# Patient Record
Sex: FEMALE | Race: BLACK OR AFRICAN AMERICAN | Employment: UNEMPLOYED | URBAN - METROPOLITAN AREA
[De-identification: names, ages, dates, MRNs, and addresses within clinical notes are randomized per-mention and may not be internally consistent; named-entity substitution may affect disease eponyms.]

---
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
## 2023-02-21 ENCOUNTER — NURSE TRIAGE (OUTPATIENT)
Dept: OTHER | Facility: CLINIC | Age: 18
End: 2023-02-21

## 2023-02-21 NOTE — TELEPHONE ENCOUNTER
Location of patient: [address] call from Cesar HOUGH at [facility]; Patient with [employer] requesting to establish care with [facility]. Subjective: Caller states \"I am having pain in my vagina\"     Current Symptoms: pelvic pain, \"leaking urine\", white vaginal discharge    Onset: 1 week ago;       Pain Severity: 6/10; Temperature: denies     What has been tried: nothing    Denies - pain with urination / blood in urine     LMP:  1/28/2023  Pregnant: no    Recommended disposition: See in Office Today  Patient does not have a PCP and was advised to go to an [address] [place], [place] [address] Ave for current problem. Care advice provided, patient verbalizes understanding; denies any other questions or concerns; instructed to call back for any new or worsening symptoms. Patient/Caller agrees with recommended disposition; writer provided warm transfer to [facility] for appointment scheduling    Attention Provider: Thank you for allowing me to participate in the care of your patient. The patient was connected to triage in response to information provided to the [facility]. Please do not respond through this encounter as the response is not directed to a shared pool.       Reason for Disposition   MILD to MODERATE pain that comes and goes (cramps) and present > 48 hours    Protocols used: Pelvic Pain - Female-ADULT-[place]

## 2023-02-21 NOTE — TELEPHONE ENCOUNTER
Location of patient: Kelley Barrios call from [place] at [facility] with [employer]. Subjective: Caller states \"I am having vaginal pain\"     Current Symptoms: White and yellow vaginal discharge. Pain with urination. Also with rib pain. Onset: 4 days ago; worsening    Associated Symptoms: NA    Pain Severity: 6/10; cracked and burning; intermittent, moderate    Temperature: denies fever     LMP: NA Pregnant: No    Recommended disposition: See in Office Today/UCC or Walk In back up. Care advice provided, patient verbalizes understanding; denies any other questions or concerns; instructed to call back for any new or worsening symptoms. Patient/Caller agrees with recommended disposition; writer provided warm transfer to Jovanny Norton at [facility] for appointment scheduling as she wants to get established with a new PCP. Attention Provider: Thank you for allowing me to participate in the care of your patient. The patient was connected to triage in response to information provided to the [facility]. Please do not respond through this encounter as the response is not directed to a shared pool.     Reason for Disposition   Bad smelling vaginal discharge    Protocols used: Vaginal Discharge-ADULT-[place]

## 2023-02-27 ENCOUNTER — HOSPITAL ENCOUNTER (OUTPATIENT)
Age: 18
Setting detail: SPECIMEN
Discharge: HOME | End: 2023-02-27

## 2023-02-27 ENCOUNTER — OFFICE VISIT (OUTPATIENT)
Dept: FAMILY MEDICINE CLINIC | Age: 18
End: 2023-02-27
Payer: MEDICAID

## 2023-02-27 VITALS
DIASTOLIC BLOOD PRESSURE: 70 MMHG | OXYGEN SATURATION: 98 % | TEMPERATURE: 97.3 F | HEART RATE: 83 BPM | SYSTOLIC BLOOD PRESSURE: 118 MMHG | HEIGHT: 62 IN | BODY MASS INDEX: 39.38 KG/M2 | WEIGHT: 214 LBS

## 2023-02-27 DIAGNOSIS — N17.9 AKI (ACUTE KIDNEY INJURY) (HCC): ICD-10-CM

## 2023-02-27 DIAGNOSIS — R30.0 DYSURIA: ICD-10-CM

## 2023-02-27 DIAGNOSIS — N89.8 VAGINAL DISCHARGE: ICD-10-CM

## 2023-02-27 DIAGNOSIS — Z11.3 SCREEN FOR STD (SEXUALLY TRANSMITTED DISEASE): ICD-10-CM

## 2023-02-27 DIAGNOSIS — D50.9 IRON DEFICIENCY ANEMIA, UNSPECIFIED IRON DEFICIENCY ANEMIA TYPE: ICD-10-CM

## 2023-02-27 DIAGNOSIS — Z76.89 ENCOUNTER TO ESTABLISH CARE: Primary | ICD-10-CM

## 2023-02-27 LAB
BILIRUBIN, POC: ABNORMAL
BLOOD URINE, POC: ABNORMAL
CANDIDA SPECIES, DNA PROBE: NEGATIVE
CLARITY, POC: CLEAR
COLOR, POC: ABNORMAL
GARDNERELLA VAGINALIS, DNA PROBE: POSITIVE
GLUCOSE URINE, POC: ABNORMAL
KETONES, POC: ABNORMAL
LEUKOCYTE EST, POC: ABNORMAL
NITRITE, POC: ABNORMAL
PH, POC: 6
PROTEIN, POC: 100
SOURCE: ABNORMAL
SPECIFIC GRAVITY, POC: 1.03
TRICHOMONAS VAGINALIS DNA: NEGATIVE
UROBILINOGEN, POC: 1

## 2023-02-27 PROCEDURE — 99204 OFFICE O/P NEW MOD 45 MIN: CPT | Performed by: STUDENT IN AN ORGANIZED HEALTH CARE EDUCATION/TRAINING PROGRAM

## 2023-02-27 PROCEDURE — 81003 URINALYSIS AUTO W/O SCOPE: CPT | Performed by: STUDENT IN AN ORGANIZED HEALTH CARE EDUCATION/TRAINING PROGRAM

## 2023-02-27 SDOH — ECONOMIC STABILITY: HOUSING INSECURITY
IN THE LAST 12 MONTHS, WAS THERE A TIME WHEN YOU DID NOT HAVE A STEADY PLACE TO SLEEP OR SLEPT IN A SHELTER (INCLUDING NOW)?: NO

## 2023-02-27 SDOH — ECONOMIC STABILITY: INCOME INSECURITY: HOW HARD IS IT FOR YOU TO PAY FOR THE VERY BASICS LIKE FOOD, HOUSING, MEDICAL CARE, AND HEATING?: NOT HARD AT ALL

## 2023-02-27 SDOH — ECONOMIC STABILITY: FOOD INSECURITY: WITHIN THE PAST 12 MONTHS, THE FOOD YOU BOUGHT JUST DIDN'T LAST AND YOU DIDN'T HAVE MONEY TO GET MORE.: NEVER TRUE

## 2023-02-27 SDOH — ECONOMIC STABILITY: FOOD INSECURITY: WITHIN THE PAST 12 MONTHS, YOU WORRIED THAT YOUR FOOD WOULD RUN OUT BEFORE YOU GOT MONEY TO BUY MORE.: NEVER TRUE

## 2023-02-27 ASSESSMENT — PATIENT HEALTH QUESTIONNAIRE - PHQ9
SUM OF ALL RESPONSES TO PHQ QUESTIONS 1-9: 0
SUM OF ALL RESPONSES TO PHQ QUESTIONS 1-9: 0
SUM OF ALL RESPONSES TO PHQ9 QUESTIONS 1 & 2: 0
1. LITTLE INTEREST OR PLEASURE IN DOING THINGS: 0
SUM OF ALL RESPONSES TO PHQ QUESTIONS 1-9: 0
2. FEELING DOWN, DEPRESSED OR HOPELESS: 0
SUM OF ALL RESPONSES TO PHQ QUESTIONS 1-9: 0

## 2023-02-27 NOTE — PROGRESS NOTES
[address] PRIMARY CARE  [address] [place] [address] [address] [place] [address]  Dept: [phone]  Dept Fax: [phone]    Chelsi Gilbert is a 21 y.o. female who is a New patient, who presents today for her medical conditions/complaints as noted below:  Chief Complaint   Patient presents with    Establish Care    Vaginal Pain    Rib Pain     No injury         HPI:     She is a 66-year-old female here today to establish care as a new patient. She has a past medical history significant for pyelonephritis with MINE, and iron deficiency anemia. She states that she has been having vaginal pain and burning with urination. She is also noticed thick vaginal discharge. She is a single mother and states that she takes care of her 3year-old child, she currently lives with her sister. She states that she does not work anywhere. She also wants to screen for for STDs. No results found for: LABA1C          ( goal A1Cis < 7)   No results found for: LABMICR  No results found for: LDLCHOLESTEROL, LDLCALC    (goal LDL is <100)   AST (U/L)   Date Value   06/13/2022 14     ALT (U/L)   Date Value   06/13/2022 19     BUN (mg/dL)   Date Value   06/14/2022 6     BP Readings from Last 3 Encounters:   02/27/23 118/70   06/14/22 125/88   01/22/21 118/80 (85 %, Z = 1.04 /  96 %, Z = 1.75)*     *BP percentiles are based on the 2017 AAP Clinical Practice Guideline for girls          (goal 120/80)    Past Medical History:   Diagnosis Date    Anemia during pregnancy 8/24/2020    Venofer 300 mg weekly x 3 First infusion scheduled for 8/29/2020 at 12 noon, pt father notified. -SK-NO SHOW    Dysmenorrhea 8/11/2020    Desires mirena PP    History of chlamydia (need TOR) 8/11/2020 7/28/20+ BULL negative 8/25/20    Hx Gonorrhea (need TOR) 8/11/2020    Positive 7/28/20 BULL negative 8/25/20    Hx Trich (BULL neg) 8/11/2020 7/28/20 positive. BULL neg 8/25/20.     Mirena IUD placed 9/29/2020 9/29/2020    Lot: Arielle Rollins Exp date: Nov 2022 Remove 9/29/2025      History reviewed. No pertinent surgical history. Family History   Problem Relation Age of Onset    Hypertension Father     Diabetes Father     No Known Problems Mother        Social History     Tobacco Use    Smoking status: Never    Smokeless tobacco: Never   Substance Use Topics    Alcohol use: Never      Current Outpatient Medications   Medication Sig Dispense Refill    metroNIDAZOLE (FLAGYL) 500 MG tablet Take 1 tablet by mouth 2 times daily for 7 days 14 tablet 0    ferrous sulfate (FE TABS) 325 (65 Fe) MG EC tablet Take 1 tablet by mouth 2 times daily (Patient not taking: No sig reported) 60 tablet 3     No current facility-administered medications for this visit. No Known Allergies    Health Maintenance   Topic Date Due    COVID-19 Vaccine (1) Never done    Hepatitis A vaccine (2 of 2 - 2-dose series) 02/27/2024 (Originally 2/21/2019)    HPV vaccine (2 - 2-dose series) 02/27/2024 (Originally 2/21/2019)    Flu vaccine (1) 02/27/2024 (Originally 8/1/2022)    Depression Screen  02/27/2024    Chlamydia/GC screen  02/27/2024    DTaP/Tdap/Td vaccine (4 - Td or Tdap) 07/28/2030    Hib vaccine  Completed    Varicella vaccine  Completed    Hepatitis C screen  Completed    HIV screen  Completed    Meningococcal (ACWY) vaccine  Aged Out    Pneumococcal 0-64 years Vaccine  Aged Out       Subjective:     Review of Systems   Constitutional:  Negative for appetite change, fatigue and fever. HENT:  Negative for congestion, ear pain, hearing loss and sore throat. Eyes:  Negative for discharge and visual disturbance. Respiratory:  Negative for cough, chest tightness, shortness of breath and wheezing. Cardiovascular:  Negative for chest pain, palpitations and leg swelling. Gastrointestinal:  Negative for abdominal pain, constipation, diarrhea, nausea and vomiting. Genitourinary:  Positive for dysuria, frequency and vaginal discharge.  Negative for flank pain, hematuria and urgency. Musculoskeletal:  Negative for arthralgias, gait problem, joint swelling and myalgias. Skin: Negative. Neurological:  Negative for dizziness, weakness, numbness and headaches. Psychiatric/Behavioral: Negative. Negative for dysphoric mood. The patient is not nervous/anxious. Objective:     Physical Exam  Vitals reviewed. Constitutional:       Appearance: Normal appearance. She is normal weight. HENT:      Head: Normocephalic and atraumatic. Nose: Nose normal.      Mouth/Throat:      Mouth: Mucous membranes are moist.      Pharynx: Oropharynx is clear. Eyes:      Extraocular Movements: Extraocular movements intact. Conjunctiva/sclera: Conjunctivae normal.      Pupils: Pupils are equal, round, and reactive to light. Cardiovascular:      Rate and Rhythm: Normal rate and regular rhythm. Heart sounds: Normal heart sounds. No murmur heard. No gallop. Pulmonary:      Effort: Pulmonary effort is normal. No respiratory distress. Breath sounds: Normal breath sounds. No stridor. No wheezing. Abdominal:      General: Bowel sounds are normal. There is no distension. Palpations: Abdomen is soft. Tenderness: There is no abdominal tenderness. There is no guarding or rebound. Musculoskeletal:         General: No swelling or tenderness. Normal range of motion. Cervical back: Normal range of motion and neck supple. Skin:     General: Skin is warm and dry. Coloration: Skin is not jaundiced. Findings: No rash. Neurological:      General: No focal deficit present. Mental Status: She is alert and oriented to person, place, and time. Psychiatric:         Mood and Affect: Mood normal.         Behavior: Behavior normal.         Thought Content:  Thought content normal.         Judgment: Judgment normal.     /70   Pulse 83   Temp 97.3 °F (36.3 °C)   Ht 5' 1.5\" (1.562 m)   Wt 214 lb (97.1 kg)   SpO2 98%   BMI 39.78 kg/m² Assessment/Plan:   1. Encounter to establish care  2. MINE (acute kidney injury) ([facility] [address]  -     Comprehensive Metabolic Panel, Fasting; Future  3. Iron deficiency anemia, unspecified iron deficiency anemia type  -     CBC with Auto Differential; Future  -     Iron and TIBC; Future  -     Ferritin; Future  -     Vitamin B12 & Folate; Future  4. Vaginal discharge  -     Vaginitis DNA Probe; Future  -     C.trachomatis N.gonorrhoeae DNA, Urine; Future  -     [facility]  5. Dysuria  -     POCT Urinalysis No Micro (Auto)  -     Culture, Urine  6. Screen for STD (sexually transmitted disease)  -     HIV Screen; Future  -     T. Pallidum Ab; Future  -     Hepatitis Panel, Acute; Future  7. BMI (body mass index), pediatric, 95-99% for age  -     Hemoglobin A1C; Future  -     Lipid, Fasting; Future  -     TSH with Reflex; Future  -     Vitamin D 25 Hydroxy; Future      MINE-in the setting of pyelonephritis, has had previous elevations in creatinine on several occasions    Chronic hypokalemia-not on any potassium supplement currently    Iron deficiency anemia-not taking any iron supplement currently    Return in about 3 months (around 5/27/2023) for Follow up labs. Orders Placed This Encounter   Procedures    Vaginitis DNA Probe     Standing Status:   Future     Number of Occurrences:   1     Standing Expiration Date:   2/27/2024    C.trachomatis N.gonorrhoeae DNA, Urine     Standing Status:   Future     Number of Occurrences:   1     Standing Expiration Date:   2/27/2024    Culture, Urine     Order Specific Question:   Specify (ex-cath, midstream, cysto, etc)?      Answer:   midstream    CBC with Auto Differential     Standing Status:   Future     Standing Expiration Date:   8/27/2023    Comprehensive Metabolic Panel, Fasting     Standing Status:   Future     Standing Expiration Date:   8/27/2023    Hemoglobin A1C     Standing Status:   Future     Standing Expiration Date:   8/27/2023 Lipid, Fasting     Standing Status:   Future     Standing Expiration Date:   8/27/2023    TSH with Reflex     Standing Status:   Future     Standing Expiration Date:   8/27/2023    Vitamin D 25 Hydroxy     Standing Status:   Future     Standing Expiration Date:   8/27/2023    Iron and TIBC     Standing Status:   Future     Standing Expiration Date:   2/27/2024    Ferritin     Standing Status:   Future     Standing Expiration Date:   2/27/2024    Vitamin B12 & Folate     Standing Status:   Future     Standing Expiration Date:   2/27/2024    HIV Screen     Standing Status:   Future     Standing Expiration Date:   2/27/2024    T. Pallidum Ab     Standing Status:   Future     Standing Expiration Date:   2/27/2024    Hepatitis Panel, Acute     Standing Status:   Future     Standing Expiration Date:   2/27/2024    [facility]     Referral Priority:   Routine     Referral Type:   Eval and Treat     Referral Reason:   Specialty Services Required     Requested Specialty:   Obstetrics & Gynecology     Number of Visits Requested:   1    POCT Urinalysis No Micro (Auto)     No orders of the defined types were placed in this encounter. Patient given educational materials - see patient instructions. Discussed use, benefit, and side effects of prescribed medications. All patientquestions answered. Pt voiced understanding. Reviewed health maintenance. Instructedto continue current medications, diet and exercise. Patient agreed with treatmentplan. Follow up as directed.      Electronically signed by Noble Najjar, MD on 2/28/2023 at 9:18 PM

## 2023-02-28 DIAGNOSIS — N76.0 BACTERIAL VAGINOSIS: Primary | ICD-10-CM

## 2023-02-28 DIAGNOSIS — N30.00 ACUTE CYSTITIS WITHOUT HEMATURIA: Primary | ICD-10-CM

## 2023-02-28 DIAGNOSIS — B96.89 BACTERIAL VAGINOSIS: Primary | ICD-10-CM

## 2023-02-28 LAB
MICROORGANISM SPEC CULT: ABNORMAL
SPECIMEN DESCRIPTION: ABNORMAL

## 2023-02-28 RX ORDER — METRONIDAZOLE 500 MG/1
500 TABLET ORAL 2 TIMES DAILY
Qty: 14 TABLET | Refills: 0 | Status: SHIPPED | OUTPATIENT
Start: 2023-02-28 | End: 2023-03-07

## 2023-02-28 ASSESSMENT — ENCOUNTER SYMPTOMS
COUGH: 0
VOMITING: 0
ABDOMINAL PAIN: 0
CHEST TIGHTNESS: 0
DIARRHEA: 0
NAUSEA: 0
CONSTIPATION: 0
SORE THROAT: 0
EYE DISCHARGE: 0
WHEEZING: 0
SHORTNESS OF BREATH: 0

## 2023-02-28 NOTE — RESULT ENCOUNTER NOTE
Please let her know that her swab was positive for bacterial vaginitis, sending prescription for metronidazole

## 2023-03-01 DIAGNOSIS — Z11.3 SCREEN FOR STD (SEXUALLY TRANSMITTED DISEASE): Primary | ICD-10-CM

## 2023-03-01 LAB
CHLAMYDIA DNA UR QL NAA+PROBE: ABNORMAL
N GONORRHOEA DNA UR QL NAA+PROBE: ABNORMAL
SPECIMEN DESCRIPTION: ABNORMAL

## 2023-05-02 NOTE — PROGRESS NOTES
"Subjective:      Patient ID: Ijeoma Issa is a 50 y.o. female.    Vitals:  height is 5' 4" (1.626 m) and weight is 114.8 kg (253 lb). Her temperature is 98.3 °F (36.8 °C). Her blood pressure is 137/98 (abnormal) and her pulse is 81. Her respiration is 16 and oxygen saturation is 96%.     Chief Complaint: Nausea    Nausea  This is a new problem. The current episode started yesterday. The problem has been gradually improving. Associated symptoms include nausea. Associated symptoms comments: Vomiting and diarrhea  .     Gastrointestinal:  Positive for nausea.    Objective:     Physical Exam   Constitutional: No distress.   HENT:   Head: Normocephalic and atraumatic.   Ears:   Right Ear: External ear normal.   Left Ear: External ear normal.   Eyes: Extraocular movement intact   Pulmonary/Chest: Effort normal.   Abdominal: Normal appearance and bowel sounds are normal. She exhibits no distension and no mass. Soft. There is no abdominal tenderness. There is no rebound, no guarding, no left CVA tenderness and no right CVA tenderness. No hernia.   Neurological: She is alert.   Psychiatric: Mood normal.   Nursing note and vitals reviewed.    Assessment:     1. Viral gastroenteritis        Plan:       Viral gastroenteritis  -     ondansetron (ZOFRAN-ODT) 4 MG TbDL; Take 1 tablet (4 mg total) by mouth every 8 (eight) hours as needed (nausea and vomiting).  Dispense: 20 tablet; Refill: 0                Patient Instructions     Diarrhea  If your condition worsens or fails to improve we recommend that you receive another evaluation at the ER immediately or contact your PCP to discuss your concerns or return here. You must understand that you've received an urgent care treatment only and that you may be released before all your medical problems are known or treated. You the patient will arrange for followup care as instructed.   If you have diarrhea you can use Pepto Bismol.  Avoid Imodium.    Avoid any greasy, spicy, "
CLINICAL PHARMACY NOTE: MEDS TO [address] Select Patient?: No  Total # of Prescriptions Filled: 2   The following medications were delivered to the patient:  · dok  · motrin  Total # of Interventions Completed: 0  Time Spent (min): 0    Additional Documentation:
Labor Progress Note    Alexandra Friedman is a 10 y.o. female  at 38w9d  The patient was seen and examined. Her pain is well controlled. She reports fetal movement is present, complains of contractions, complains of loss of fluid, denies vaginal bleeding.        Vital Signs:  Vitals:    20 0831 20 0901 20 0927 20 1001   BP: 111/51 111/64 113/55 118/70   Pulse: 97 84 90 88   Resp: 16 18 16 16   Temp:    98.2 °F (36.8 °C)   TempSrc:    Oral   SpO2:       Weight:       Height:             FHT: 120, moderate variability, accelerations present, decelerations absent  Contractions: regular, every 1-2 minutes  Cervical Exam: 6 cm dilated, 90 effaced, 0 station  Pitocin: @ 2 mu/min    Membranes: Ruptured clear fluid  Scalp Electrode in place: absent  Intrauterine Pressure Catheter in Place: absent    Interventions: none    Assessment/Plan:  Alexandra Friedman is a 10 y.o. female  at 38w9d admitted for spontaneous labor   - GBS negative, No indication for GBS prophylaxis              - VSS, afebrile              - Pitocin @ 0830              - AROM (thick mec) @ 0550              - Epidural              - Continue pitocin per protocol              - Continue expectant management       Attending updated and in agreement with plan    Micky Cerna DO  Ob/Gyn Resident  2020, 10:12 AM
Labor Progress Note    Bea Zazueta is a 10 y.o. female  at 38w9d  The patient was seen and examined. Her pain is well controlled. She reports fetal movement is present, complains of contractions, complains of loss of fluid, denies vaginal bleeding.        Vital Signs:  Vitals:    20 0630 20 0731 20 0801 20 0831   BP: 95/53 100/49 106/48 111/51   Pulse: 85 94 96 97   Resp: 16 16 16 16   Temp:   97.5 °F (36.4 °C)    TempSrc:   Oral    SpO2:       Weight:       Height:             FHT: 120, moderate variability, accelerations present, decelerations absent  Contractions: regular, every 1-2 minutes  Cervical Exam: 5 cm dilated, 90 effaced, 0 station; per RN  Pitocin: @ 1 mu/min    Membranes: Ruptured meconium stained  Scalp Electrode in place: absent  Intrauterine Pressure Catheter in Place: absent    Interventions: none    Assessment/Plan:  Bea Zazueta is a 10 y.o. female  at 38w9d admitted for spontaneous labor   - GBS negative, No indication for GBS prophylaxis   - VSS, afebrile   - Pitocin @ 0830   - AROM (thick mec) @ 0550   - Epidural   - Continue pitocin per protocol   - Continue expectant management        Senior resident updated and in agreement with plan    Olga Weiss DO  Ob/Gyn Resident  2020, 9:01 AM
Labor Progress Note    Mayur Jimenez is a 10 y.o. female  at 38w9d  The patient was seen and examined. Her pain is well controlled. She reports fetal movement is present, complains of contractions, complains of loss of fluid, denies vaginal bleeding.        Vital Signs:  Vitals:    20 1430 20 1501 20 1531 20 1601   BP: 120/80 115/73 106/68 116/64   Pulse: 119 113 126 120   Resp: 18 18 16 18   Temp:       TempSrc:       SpO2:       Weight:       Height:             FHT: 140, moderate variability, accelerations present, decelerations absent  Contractions: regular, every 1-2 minutes  Cervical Exam: 9 cm dilated, 90 effaced, +1 station  Pitocin: @ 0 mu/min    Membranes: Ruptured meconium stained  Scalp Electrode in place: absent  Intrauterine Pressure Catheter in Place: absent    Interventions: none    Assessment/Plan:  Mayur Jimenez is a 10 y.o. female  at 38w9d admitted for spontaneous labor    - GBS negative, No indication for GBS prophylaxis              - VSS, afebrile              - Pitocin @ 0830              - AROM (thick mec) @ 0550 (NICU notified)   - IUPC              - Epidural              - Continue pitocin per protocol              - Continue expectant management      Attending updated and in agreement with plan    Radu Kidd DO  Ob/Gyn Resident  2020, 4:11 PM
Labor Progress Note    Rivera Pérez is a 10 y.o. female  at 38w9d  The patient was seen and examined. Her pain is well controlled. She reports fetal movement is present, complains of contractions, complains of loss of fluid, denies vaginal bleeding. Solitario catheter and IUPC placed without difficulty, patient tolerated well. Vital Signs:  Vitals:    20 1300 20 1301 20 1330 20 1400   BP:  109/64 120/75 127/81   Pulse:  112 100 102   Resp:  16 16 18   Temp:    98.8 °F (37.1 °C)   TempSrc:    Oral   SpO2: 99%  99%    Weight:       Height:           FHT: 155, moderate variability, accelerations present, decelerations absent  Contractions: regular, every 2 minutes  Cervical Exam: 8 cm dilated, 90% effaced, 0 station  Pitocin: @ 2 mu/min    Membranes: Ruptured meconium stained  Scalp Electrode in place: absent  Intrauterine Pressure Catheter in Place: present    Interventions: IUPC placed without difficulty, patient tolerated well    Assessment/Plan:  Rivera Pérez is a 10 y.o. female  at 38w9d admitted for Spontaneous Labor   - GBS negative, No indication for GBS prophylaxis   - COVID-19 negative on 20   - CEFM, TOCO   - IVF (LR @ 125 cc/hr)   - Continue pitocin per protocol    - AROM (thick meconium stained fluid) @ 0550 (NICU notified)   - IUPC placed without difficulty   - Epidural in place   - Continue expectant management     Attending updated and in agreement with plan.      Tulio Ferrell DO  Ob/Gyn Resident  2020, 2:14 PM
Pt presents to L and D, accompanied by Father, via abulatory. Pt here for contractions that started 09/28/2020 around 6pm and are irregular. Pt gowned and in bed, oriented to room and call light. EFM explained and applied. FHT's 132. Dr. Clem Bergeron notified of admission.
fatty or acidic foods at this time.    Increase fluids and rest is important.  Start a clear liquid diet and progress to BRAT diet (see attached handout).     Watch for any increase pain, fever, localized pain to right lower abdomen or continued vomiting or diarrhea.      Nausea & Vomiting  If your condition worsens or fails to improve we recommend that you receive another evaluation at the ER immediately or contact your PCP to discuss your concerns or return here. You must understand that you've received an urgent care treatment only and that you may be released before all your medical problems are known or treated. You the patient will arrange for followup care as instructed.   Use prescribed anti-nausea medicine as needed and prescribed   Increase fluids and rest is important   Start off with liquid diet and progress as tolerated (see below)  Water and clear liquids are important so you do not get dehydrated. Drink a small amount at a time.  Do not force yourself to eat, especially if you have cramps, vomiting, or diarrhea. When you finally decide to start eating, do not eat large amounts at a time, even if you are hungry.  If you eat, avoid fatty, greasy, spicy, or fried foods.  Do not eat dairy products if you have diarrhea; they can make the diarrhea worse  Watch for any increase pain, fever, localized pain to right lower abdomen or continued vomiting or diarrhea.      
infant in [facility], circumcision desired   - Encourage ambulation  2. Rh positive/Rubella immune  3. Bottle feeding    - Denies s/s mastitis  4. Teen pregnancy  - SW consult PP  5. Anemia  - Hgb 10.0  - clinically asymptomatic  6. Hx GC/C  - BULL negative  7. Hx Trich   - BULL negative  8. BMI 36.6  9. Continue post partum care    Counseling Completed:  Secondary Smoke risks and Sudden Infant Death Syndrome were reviewed with recommendations. Infant sleeping, \"back to sleep\" and avoidance of co-sleeping recommendations were reviewed. Signs and Symptoms of Post Partum Depression were reviewed. The patient is to call if any occur. Signs and symptoms of Mastitis were reviewed. The patient is to call if any occur for follow up.   Discharge instructions including pelvic rest, no driving with pain medicine and office follow-up were reviewed with patient     Attending Physician: Dr. Daya Armendariz DO  Ob/Gyn Resident   9/30/2020, 6:41 AM

## 2024-05-27 NOTE — PROGRESS NOTES
Patient present for UPT, LMP 12/2019 ,UPT results positive, and results given to patient and patient's father. Skin normal color for race, warm, dry and intact. No evidence of rash.

## 2024-10-04 ENCOUNTER — HOSPITAL ENCOUNTER (EMERGENCY)
Age: 19
Discharge: HOME | End: 2024-10-04
Attending: EMERGENCY MEDICINE
Payer: MEDICAID

## 2024-10-04 VITALS
TEMPERATURE: 99.3 F | BODY MASS INDEX: 43.62 KG/M2 | OXYGEN SATURATION: 99 % | SYSTOLIC BLOOD PRESSURE: 120 MMHG | WEIGHT: 231.04 LBS | HEIGHT: 61 IN | DIASTOLIC BLOOD PRESSURE: 79 MMHG | HEART RATE: 100 BPM | RESPIRATION RATE: 16 BRPM

## 2024-10-04 DIAGNOSIS — N10 ACUTE PYELONEPHRITIS: Primary | ICD-10-CM

## 2024-10-04 DIAGNOSIS — B96.89 GARDNERELLA ASSOCIATED VAGINAL DISCHARGE: ICD-10-CM

## 2024-10-04 DIAGNOSIS — N76.0 GARDNERELLA ASSOCIATED VAGINAL DISCHARGE: ICD-10-CM

## 2024-10-04 LAB
ALBUMIN SERPL-MCNC: 4 G/DL (ref 3.5–5.2)
ALBUMIN/GLOB SERPL: 1 {RATIO} (ref 1–2.5)
ALP SERPL-CCNC: 97 U/L (ref 35–104)
ALT SERPL-CCNC: 16 U/L (ref 10–35)
ANION GAP SERPL CALCULATED.3IONS-SCNC: 8 MMOL/L (ref 9–16)
AST SERPL-CCNC: 23 U/L (ref 10–35)
BACTERIA URNS QL MICRO: ABNORMAL
BILIRUB SERPL-MCNC: 0.4 MG/DL (ref 0–1.2)
BILIRUB UR QL STRIP: NEGATIVE
BUN SERPL-MCNC: 13 MG/DL (ref 6–20)
C TRACH DNA SPEC QL PROBE+SIG AMP: NORMAL
CALCIUM SERPL-MCNC: 9 MG/DL (ref 8.6–10.4)
CANDIDA SPECIES: NEGATIVE
CASTS #/AREA URNS LPF: ABNORMAL /LPF (ref 0–8)
CHLORIDE SERPL-SCNC: 104 MMOL/L (ref 98–107)
CLARITY UR: ABNORMAL
CO2 SERPL-SCNC: 23 MMOL/L (ref 20–31)
COLOR UR: YELLOW
CREAT SERPL-MCNC: 0.8 MG/DL (ref 0.5–0.9)
EPI CELLS #/AREA URNS HPF: ABNORMAL /HPF (ref 0–5)
ERYTHROCYTE [DISTWIDTH] IN BLOOD BY AUTOMATED COUNT: 15.8 % (ref 11.8–14.4)
GARDNERELLA VAGINALIS: POSITIVE
GFR, ESTIMATED: >90 ML/MIN/1.73M2
GLUCOSE SERPL-MCNC: 84 MG/DL (ref 74–99)
GLUCOSE UR STRIP-MCNC: NEGATIVE MG/DL
HCG SERPL QL: NEGATIVE
HCT VFR BLD AUTO: 35 % (ref 36.3–47.1)
HGB BLD-MCNC: 10.9 G/DL (ref 11.9–15.1)
HGB UR QL STRIP.AUTO: NEGATIVE
HIV 1+2 AB+HIV1 P24 AG SERPL QL IA: NONREACTIVE
KETONES UR STRIP-MCNC: NEGATIVE MG/DL
LEUKOCYTE ESTERASE UR QL STRIP: ABNORMAL
LIPASE SERPL-CCNC: 30 U/L (ref 13–60)
MCH RBC QN AUTO: 27.2 PG (ref 25.2–33.5)
MCHC RBC AUTO-ENTMCNC: 31.1 G/DL (ref 28.4–34.8)
MCV RBC AUTO: 87.3 FL (ref 82.6–102.9)
N GONORRHOEA DNA SPEC QL PROBE+SIG AMP: NORMAL
NITRITE UR QL STRIP: NEGATIVE
NRBC BLD-RTO: 0 PER 100 WBC
PH UR STRIP: 5.5 [PH] (ref 5–8)
PLATELET # BLD AUTO: 210 K/UL (ref 138–453)
PMV BLD AUTO: 11 FL (ref 8.1–13.5)
POTASSIUM SERPL-SCNC: 3.8 MMOL/L (ref 3.7–5.3)
PROT SERPL-MCNC: 8.2 G/DL (ref 6.6–8.7)
PROT UR STRIP-MCNC: ABNORMAL MG/DL
RBC # BLD AUTO: 4.01 M/UL (ref 3.95–5.11)
RBC #/AREA URNS HPF: ABNORMAL /HPF (ref 0–4)
SODIUM SERPL-SCNC: 135 MMOL/L (ref 136–145)
SOURCE: ABNORMAL
SP GR UR STRIP: 1.03 (ref 1–1.03)
SPECIMEN DESCRIPTION: NORMAL
T PALLIDUM AB SER QL IA: NONREACTIVE
TRICHOMONAS: NEGATIVE
UROBILINOGEN UR STRIP-ACNC: NORMAL EU/DL (ref 0–1)
WBC #/AREA URNS HPF: ABNORMAL /HPF (ref 0–5)
WBC OTHER # BLD: 7.6 K/UL (ref 4.5–13.5)

## 2024-10-04 PROCEDURE — 85027 COMPLETE CBC AUTOMATED: CPT

## 2024-10-04 PROCEDURE — 87086 URINE CULTURE/COLONY COUNT: CPT

## 2024-10-04 PROCEDURE — 87510 GARDNER VAG DNA DIR PROBE: CPT

## 2024-10-04 PROCEDURE — 87389 HIV-1 AG W/HIV-1&-2 AB AG IA: CPT

## 2024-10-04 PROCEDURE — 87660 TRICHOMONAS VAGIN DIR PROBE: CPT

## 2024-10-04 PROCEDURE — 80053 COMPREHEN METABOLIC PANEL: CPT

## 2024-10-04 PROCEDURE — 87491 CHLMYD TRACH DNA AMP PROBE: CPT

## 2024-10-04 PROCEDURE — 87591 N.GONORRHOEAE DNA AMP PROB: CPT

## 2024-10-04 PROCEDURE — 81001 URINALYSIS AUTO W/SCOPE: CPT

## 2024-10-04 PROCEDURE — 99284 EMERGENCY DEPT VISIT MOD MDM: CPT

## 2024-10-04 PROCEDURE — 83690 ASSAY OF LIPASE: CPT

## 2024-10-04 PROCEDURE — 84703 CHORIONIC GONADOTROPIN ASSAY: CPT

## 2024-10-04 PROCEDURE — 2580000003 HC RX 258

## 2024-10-04 PROCEDURE — 86780 TREPONEMA PALLIDUM: CPT

## 2024-10-04 PROCEDURE — 86403 PARTICLE AGGLUT ANTBDY SCRN: CPT

## 2024-10-04 PROCEDURE — 87480 CANDIDA DNA DIR PROBE: CPT

## 2024-10-04 RX ORDER — ONDANSETRON 4 MG/1
4 TABLET, ORALLY DISINTEGRATING ORAL 3 TIMES DAILY PRN
Qty: 10 TABLET | Refills: 0 | Status: SHIPPED | OUTPATIENT
Start: 2024-10-04

## 2024-10-04 RX ORDER — CEPHALEXIN 500 MG/1
500 CAPSULE ORAL 4 TIMES DAILY
Qty: 28 CAPSULE | Refills: 0 | Status: SHIPPED | OUTPATIENT
Start: 2024-10-04 | End: 2024-10-11

## 2024-10-04 RX ORDER — METRONIDAZOLE 500 MG/1
500 TABLET ORAL 2 TIMES DAILY
Qty: 14 TABLET | Refills: 0 | Status: SHIPPED | OUTPATIENT
Start: 2024-10-04 | End: 2024-10-11

## 2024-10-04 RX ORDER — 0.9 % SODIUM CHLORIDE 0.9 %
1000 INTRAVENOUS SOLUTION INTRAVENOUS ONCE
Status: COMPLETED | OUTPATIENT
Start: 2024-10-04 | End: 2024-10-04

## 2024-10-04 RX ADMIN — SODIUM CHLORIDE 1000 ML: 9 INJECTION, SOLUTION INTRAVENOUS at 13:00

## 2024-10-04 ASSESSMENT — ENCOUNTER SYMPTOMS
CONSTIPATION: 0
ABDOMINAL PAIN: 0
DIARRHEA: 0
NAUSEA: 0
SHORTNESS OF BREATH: 0
VOMITING: 0

## 2024-10-04 ASSESSMENT — LIFESTYLE VARIABLES
HOW MANY STANDARD DRINKS CONTAINING ALCOHOL DO YOU HAVE ON A TYPICAL DAY: PATIENT DOES NOT DRINK
HOW OFTEN DO YOU HAVE A DRINK CONTAINING ALCOHOL: NEVER

## 2024-10-04 ASSESSMENT — PAIN - FUNCTIONAL ASSESSMENT: PAIN_FUNCTIONAL_ASSESSMENT: 0-10

## 2024-10-04 ASSESSMENT — PAIN SCALES - GENERAL: PAINLEVEL_OUTOF10: 7

## 2024-10-04 ASSESSMENT — PAIN DESCRIPTION - DESCRIPTORS: DESCRIPTORS: SHARP

## 2024-10-04 ASSESSMENT — PAIN DESCRIPTION - ORIENTATION: ORIENTATION: LEFT

## 2024-10-04 ASSESSMENT — PAIN DESCRIPTION - LOCATION: LOCATION: ABDOMEN

## 2024-10-04 NOTE — DISCHARGE INSTRUCTIONS
You were seen in the emergency department for left-sided flank pain as well as vaginal discharge.  As we discussed, there is evidence of urinary tract infection.  You must take Keflex 4 times daily until the course is complete to treat this.    Your vaginal discharge is likely related to bacterial vaginosis.  This is an overgrowth of bacteria in the vagina, it is not a sexually transmitted infection.    The treatment for bacterial vaginosis is Flagyl twice daily.    Ensure you complete the antibiotics entirely.  Do not stop taking it until the course is complete.    Given that you are on 2 antibiotics, it is likely that you will become nauseous.  A short course of Zofran has been sent to your pharmacy.  Only take this as needed for nausea/vomiting.    Treat complete the entire course of antibiotics.  Return to the emergency room if you have worsening pain, worsening discharge, fever or chills, or any other acute medical concern.    Schedule an appointment to be seen by your primary care doctor soon as possible for further follow-up.

## 2024-10-04 NOTE — ED PROVIDER NOTES
[facility] ED  Emergency Department Encounter  Emergency Medicine Resident     Pt Name:Breanne Mckeon  MRN: [id number]  Birthdate 2005  Date of evaluation: 10/4/24  PCP:  Makeda Whitfield MD  Note Started: 12:29 PM EDT      CHIEF COMPLAINT       Chief Complaint   Patient presents with    Abdominal Pain     Left side       HISTORY OF PRESENT ILLNESS  (Location/Symptom, Timing/Onset, Context/Setting, Quality, Duration, Modifying Factors, Severity.)      Breanne Mckeon is a 18 y.o. female who presents with worsening left flank pain intermittently over the past few weeks.  Patient does endorse increased urinary frequency, denies dysuria, denies hematuria, denies any trauma.    Patient does also complain of vaginal discharge, denies pelvic pain, denies vaginal bleeding, states that she does not believe she is pregnant.  Patient does have a history of STDs and is sexually active with 1 male sexual partner with intermittent condom use.  She states she does use the Mirena coil.    Patient denies fever or chills, denies previous abdominal surgical history.  She denies ongoing medications.  She states that she is a couple of days late on her period.    She denies nausea or vomiting, denies constipation or diarrhea, denies hematochezia.  She states the last time she felt this way she was told that she had a kidney infection.    PAST MEDICAL / SURGICAL / SOCIAL / FAMILY HISTORY      has a past medical history of Anemia during pregnancy, Dysmenorrhea, History of chlamydia (need TOR), Hx Gonorrhea (need TOR), Hx Trich (BULL neg), and Mirena IUD placed 9/29/2020.     has no past surgical history on file.    Social History     Socioeconomic History    Marital status: Single     Spouse name: Not on file    Number of children: Not on file    Years of education: Not on file    Highest education level: Not on file   Occupational History    Not on file   Tobacco Use    Smoking status: Never    Smokeless tobacco: Never  Gardnerella associated vaginal discharge          DISPOSITION / PLAN     DISPOSITION Decision To Discharge 10/04/2024 03:07:30 PM  Condition at Disposition: Data Unavailable      PATIENT REFERRED TO:  [facility] ED  [address] [address]  [phone]  Go to   If symptoms worsen    Makeda Whitfield MD  [address] [facility]  [place] [address]  [phone]    Schedule an appointment as soon as possible for a visit         DISCHARGE MEDICATIONS:  Discharge Medication List as of 10/4/2024  3:42 PM        START taking these medications    Details   cephALEXin (KEFLEX) 500 MG capsule Take 1 capsule by mouth 4 times daily for 7 days, Disp-28 capsule, R-0Normal      metroNIDAZOLE (FLAGYL) 500 MG tablet Take 1 tablet by mouth 2 times daily for 7 days, Disp-14 tablet, R-0Normal      ondansetron (ZOFRAN-ODT) 4 MG disintegrating tablet Take 1 tablet by mouth 3 times daily as needed for Nausea or Vomiting, Disp-10 tablet, R-0Normal             Andrez Beltran MD  Emergency Medicine Resident    (Please note that portions of this note were completed with a voice recognition program.  Efforts were made to edit the dictations but occasionally words are mis-transcribed.)

## 2024-10-04 NOTE — ED PROVIDER NOTES
[facility]  Emergency Department  Faculty Attestation     I performed a history and physical examination of the patient and discussed management with the resident. I reviewed the resident’s note and agree with the documented findings and plan of care. Any areas of disagreement are noted on the chart. I was personally present for the key portions of any procedures. I have documented in the chart those procedures where I was not present during the key portions. I have reviewed the emergency nurses triage note. I agree with the chief complaint, past medical history, past surgical history, allergies, medications, social and family history as documented unless otherwise noted below.    For Physician Assistant/ Nurse Practitioner cases/documentation I have personally evaluated this patient and have completed at least one if not all key elements of the E/M (history, physical exam, and MDM). Additional findings are as noted.    Preliminary note started at 12:58 PM EDT    Primary Care Physician:  Makeda Whitfield MD    Screenings:  [unfilled]    CHIEF COMPLAINT       Chief Complaint   Patient presents with    Abdominal Pain     Left side       RECENT VITALS:   /79   Pulse 100   Temp 99.3 °F (37.4 °C) (Oral)   Resp 16   Ht 1.549 m (5' 1\")   Wt 104.8 kg (231 lb 0.7 oz)   SpO2 99%   BMI 43.65 kg/m²     LABS:  Labs Reviewed   VAGINITIS DNA PROBE   C.TRACHOMATIS N.GONORRHOEAE DNA   COMPREHENSIVE METABOLIC PANEL   LIPASE   CBC   URINALYSIS WITH REFLEX TO CULTURE   HCG, SERUM, QUALITATIVE   HIV SCREEN   T. PALLIDUM AB       Radiology  No orders to display         Attending Physician Additional  Notes    Patient has left side abdominal pain for some period of time.  She denies urine symptoms.  No nausea vomiting.  No bleeding.  No faintness.  On exam she has quite a flat affect, is tachycardic but afebrile.  No CVA tenderness.  Abdomen is benign.  Impression is abdominal pain

## 2024-10-04 NOTE — ED TRIAGE NOTES
Pt presented to ED 22 via Triage  Pt presents with C/O left sided abdominal pain.  Pt states she has had left sided abdominal pain ongoing for multiple years. Pt states the pain feels worse today. Pt states the pain is a sharp 7/10 pain. Pt denies any N/V/D. Pt states sometime she feels dizzy and lightheaded with the pain but not currently. Pt states she has no trouble eating or drinking.   Pt is Alert and oriented. Pt is resting comfortably on stretcher with call light in reach.  No acute distress noted. Respirations are even and unlabored.  White board updated. Will continue to follow plan of care.

## 2024-10-04 NOTE — ED NOTES
The following labs were labeled with appropriate pt sticker and tubed to lab:     [] Blue     [x] Lavender   [] on ice  [x] Green/yellow  [] Green/black [] on ice  [] Erwin  [] on ice  [x] Yellow x3  [] Red  [] Pink  [] Type/ Screen  [] ABG  [] VBG    [] COVID-19 swab    [] Rapid  [] PCR  [] Flu swab  [] Peds Viral Panel     [x] Urine Sample  [] Fecal Sample  [] Pelvic Cultures  [] Blood Cultures  [] X 2  [] STREP Cultures  [] Wound Cultures

## 2024-10-05 LAB
MICROORGANISM SPEC CULT: ABNORMAL
SPECIMEN DESCRIPTION: ABNORMAL

## 2024-10-06 NOTE — PROGRESS NOTES
Reviewed patient's urine culture - culture positive for group b strep.  Patient was discharged on cephalexin, and culture is sensitive to prescribed medication.  Antibiotic prescribed at discharge is appropriate - no changes made to antibiotic regimen.     Jose Florez Pharm.D., BCCCP

## 2024-10-07 LAB
C TRACH DNA SPEC QL PROBE+SIG AMP: ABNORMAL
N GONORRHOEA DNA SPEC QL PROBE+SIG AMP: NEGATIVE
SPECIMEN DESCRIPTION: ABNORMAL

## 2024-10-08 ENCOUNTER — TELEPHONE (OUTPATIENT)
Dept: FAMILY MEDICINE CLINIC | Age: 19
End: 2024-10-08

## 2024-10-08 ENCOUNTER — TELEPHONE (OUTPATIENT)
Age: 19
End: 2024-10-08

## 2024-10-08 RX ORDER — DOXYCYCLINE HYCLATE 100 MG
100 TABLET ORAL 2 TIMES DAILY
Qty: 14 TABLET | Refills: 0 | Status: SHIPPED | OUTPATIENT
Start: 2024-10-08 | End: 2024-10-15

## 2024-10-08 NOTE — TELEPHONE ENCOUNTER
CLINICAL PHARMACY NOTE:  Telephone Follow-up for Positive STD Test    At the time of Breanne Mckeon's visit to [facility] Emergency Department on 10/4/2024 STD testing was performed. DNA testing was positive for Chlamydia.     Pregnancy status:  negative.    Spoke to patient on 10/8/2024 to review test results and regarding need to start oral antibiotic therapy.      Instructed patient to abstain from sexual intercourse until receiving the antibiotic and then for 7 days during treatment. Patient also advised to inform any partners that they will need to be tested as well.  Patient verbally expressed understanding of above plan.    Per protocol, prescription for doxycycline 100mg orally twice daily x 7 days sent to [facility] on behalf of Dr. Vinod Canchola.       Asmita Randall, [facility], CACP  Clinical Pharmacist Medication Management  10/8/2024  9:09 AM      For Pharmacy Admin Tracking Only    Intervention Detail: Adherence Monitorin and New Rx: 1, reason: Needs Additional Therapy  Total # of Interventions Recommended: 3  Total # of Interventions Accepted: 3  Time Spent (min): 15

## 2024-10-08 NOTE — TELEPHONE ENCOUNTER
[facility] ED Follow up Call    Reason for ED visit:  Acute pyelonephritis      10/8/2024     Manuel Raymundo , this is Pooja from Makeda Silva's office, just calling to see how you are doing after your recent ED visit.    Did you receive discharge instructions?  Yes  Do you understand the discharge instructions? Yes  Did the ED give you any new prescriptions? Yes  Were you able to fill your prescriptions? Yes      Do you have one of our red, yellow and green  Zone sheets that help you to determine when you should go to the ED?  No      Do you need or want to make a follow up appt with your PCP?  No    Do you have any further needs in the home, e.g. equipment?  No        FU appts/Provider:    No future appointments.

## 2025-03-20 ENCOUNTER — OFFICE VISIT (OUTPATIENT)
Dept: INTERNAL MEDICINE CLINIC | Age: 20
End: 2025-03-20

## 2025-03-20 VITALS
HEART RATE: 84 BPM | OXYGEN SATURATION: 99 % | WEIGHT: 244 LBS | DIASTOLIC BLOOD PRESSURE: 84 MMHG | SYSTOLIC BLOOD PRESSURE: 120 MMHG | BODY MASS INDEX: 46.1 KG/M2

## 2025-03-20 DIAGNOSIS — Z23 NEEDS FLU SHOT: ICD-10-CM

## 2025-03-20 DIAGNOSIS — D64.9 ANEMIA, UNSPECIFIED TYPE: ICD-10-CM

## 2025-03-20 DIAGNOSIS — Z97.5 IUD (INTRAUTERINE DEVICE) IN PLACE: ICD-10-CM

## 2025-03-20 DIAGNOSIS — Z59.82 TRANSPORTATION INSECURITY: ICD-10-CM

## 2025-03-20 DIAGNOSIS — R51.9 NONINTRACTABLE HEADACHE, UNSPECIFIED CHRONICITY PATTERN, UNSPECIFIED HEADACHE TYPE: ICD-10-CM

## 2025-03-20 DIAGNOSIS — E55.9 VITAMIN D DEFICIENCY: ICD-10-CM

## 2025-03-20 DIAGNOSIS — G47.9 SLEEP DISTURBANCE: ICD-10-CM

## 2025-03-20 DIAGNOSIS — R73.09 ELEVATED GLUCOSE: ICD-10-CM

## 2025-03-20 DIAGNOSIS — R06.83 SNORING: ICD-10-CM

## 2025-03-20 DIAGNOSIS — Z11.3 ROUTINE SCREENING FOR STI (SEXUALLY TRANSMITTED INFECTION): ICD-10-CM

## 2025-03-20 DIAGNOSIS — Z00.00 ENCOUNTER FOR WELL ADULT EXAM WITHOUT ABNORMAL FINDINGS: Primary | ICD-10-CM

## 2025-03-20 DIAGNOSIS — E66.9 OBESITY, UNSPECIFIED CLASS, UNSPECIFIED OBESITY TYPE, UNSPECIFIED WHETHER SERIOUS COMORBIDITY PRESENT: ICD-10-CM

## 2025-03-20 DIAGNOSIS — Z87.448 HISTORY OF PYELONEPHRITIS: ICD-10-CM

## 2025-03-20 DIAGNOSIS — Z13.29 SCREENING FOR ENDOCRINE DISORDER: ICD-10-CM

## 2025-03-20 DIAGNOSIS — Z13.220 LIPID SCREENING: ICD-10-CM

## 2025-03-20 DIAGNOSIS — R53.83 FATIGUE, UNSPECIFIED TYPE: ICD-10-CM

## 2025-03-20 DIAGNOSIS — Z76.89 ENCOUNTER TO ESTABLISH CARE: ICD-10-CM

## 2025-03-20 PROCEDURE — 90661 CCIIV3 VAC ABX FR 0.5 ML IM: CPT | Performed by: NURSE PRACTITIONER

## 2025-03-20 PROCEDURE — 90471 IMMUNIZATION ADMIN: CPT | Performed by: NURSE PRACTITIONER

## 2025-03-20 PROCEDURE — 99385 PREV VISIT NEW AGE 18-39: CPT | Performed by: NURSE PRACTITIONER

## 2025-03-20 SDOH — ECONOMIC STABILITY: FOOD INSECURITY: WITHIN THE PAST 12 MONTHS, THE FOOD YOU BOUGHT JUST DIDN'T LAST AND YOU DIDN'T HAVE MONEY TO GET MORE.: NEVER TRUE

## 2025-03-20 SDOH — ECONOMIC STABILITY: FOOD INSECURITY: WITHIN THE PAST 12 MONTHS, YOU WORRIED THAT YOUR FOOD WOULD RUN OUT BEFORE YOU GOT MONEY TO BUY MORE.: NEVER TRUE

## 2025-03-20 SDOH — ECONOMIC STABILITY - TRANSPORTATION SECURITY: TRANSPORTATION INSECURITY: Z59.82

## 2025-03-20 ASSESSMENT — PATIENT HEALTH QUESTIONNAIRE - PHQ9
SUM OF ALL RESPONSES TO PHQ QUESTIONS 1-9: 0
SUM OF ALL RESPONSES TO PHQ QUESTIONS 1-9: 0
2. FEELING DOWN, DEPRESSED OR HOPELESS: NOT AT ALL
1. LITTLE INTEREST OR PLEASURE IN DOING THINGS: NOT AT ALL
SUM OF ALL RESPONSES TO PHQ QUESTIONS 1-9: 0
SUM OF ALL RESPONSES TO PHQ QUESTIONS 1-9: 0

## 2025-03-20 NOTE — PROGRESS NOTES
Thyroid: No thyromegaly.   Cardiovascular:      Rate and Rhythm: Normal rate and regular rhythm.      Pulses: Normal pulses.      Heart sounds: Normal heart sounds. No murmur heard.  Pulmonary:      Effort: Pulmonary effort is normal.      Breath sounds: Normal breath sounds. No wheezing.   Abdominal:      General: Bowel sounds are normal. There is no distension.      Palpations: Abdomen is soft.      Tenderness: There is no abdominal tenderness.   Musculoskeletal:      Cervical back: Normal range of motion and neck supple. No deformity or tenderness.      Thoracic back: No deformity or tenderness.      Lumbar back: No deformity or tenderness. Normal range of motion.      Right lower leg: No edema.      Left lower leg: No edema.   Lymphadenopathy:      Cervical: No cervical adenopathy.   Skin:     General: Skin is warm and dry.      Findings: No rash.   Neurological:      Mental Status: She is alert and oriented to person, place, and time.      Gait: Gait normal.   Psychiatric:         Mood and Affect: Mood normal.         Behavior: Behavior is cooperative.      Comments: Somewhat flat affect             Personalized Preventive Plan  Current Health Maintenance Status  Immunization History   Administered Date(s) Administered    DTaP 01/12/2004, 08/09/2005    DTaP, INFANRIX, (age 6w-6y), IM, 0.5mL 01/12/2004, 08/09/2005    RMzI-NEBO-KCY, PEDIARIX, (age 6w-6y), IM, 0.5mL 03/08/2004, 05/17/2004    HPV, GARDASIL 9, (age 9y-45y), IM, 0.5mL 08/21/2018    Hep A, HAVRIX, VAQTA, (age 12m-18y), IM, 0.5mL 08/21/2018    Hep B, ENGERIX-B, RECOMBIVAX-HB, (age Birth - 19y), IM, 0.5mL 01/12/2004    Hib (HbOC) 08/09/2005    Hib PRP-T, ACTHIB (age 2m-5y, Adlt Risk), HIBERIX (age 6w-4y, Adlt Risk), IM, 0.5mL 03/08/2004, 05/17/2004    Hib vaccine 01/12/2004    Influenza Virus Vaccine 08/09/2005    Influenza, FLUCELVAX, (age 6 mo+) IM, Trivalent PF, 0.5mL 03/20/2025    MMR, PRIORIX, M-M-R II, (age 12m+), SC, 0.5mL 11/22/2004,

## 2025-03-24 ENCOUNTER — TELEPHONE (OUTPATIENT)
Dept: INTERNAL MEDICINE CLINIC | Age: 20
End: 2025-03-24

## 2025-03-24 NOTE — TELEPHONE ENCOUNTER
Breanne Mckeon was contacted by a Community Health Navigator to discuss a referral for SDOH related needs.     Writer spoke with: Patient and explained the services and assistance that can be provided by a Community Health Navigator.     Patient agreeable to receiving resources and support from writer.     Intake Notes: Patient recently applied for Medicaid and waiting for response. Patient will qualify for transportation through insurance once it is received.   Patient does not have any qualifying diagnosis that would prevent patient from using [employer] bus.   Patient advised to call and check the status of application.    Actions to be completed by writer: Close referral.    Actions to be completed by patient: Close referral.      Darling Espitia MA

## 2025-03-26 ENCOUNTER — HOSPITAL ENCOUNTER (OUTPATIENT)
Age: 20
Setting detail: SPECIMEN
Discharge: HOME | End: 2025-03-26

## 2025-03-26 DIAGNOSIS — R53.83 FATIGUE, UNSPECIFIED TYPE: ICD-10-CM

## 2025-03-26 DIAGNOSIS — R73.09 ELEVATED GLUCOSE: ICD-10-CM

## 2025-03-26 DIAGNOSIS — Z11.3 ROUTINE SCREENING FOR STI (SEXUALLY TRANSMITTED INFECTION): ICD-10-CM

## 2025-03-26 DIAGNOSIS — E55.9 VITAMIN D DEFICIENCY: ICD-10-CM

## 2025-03-26 DIAGNOSIS — Z87.448 HISTORY OF PYELONEPHRITIS: ICD-10-CM

## 2025-03-26 DIAGNOSIS — E66.9 OBESITY, UNSPECIFIED CLASS, UNSPECIFIED OBESITY TYPE, UNSPECIFIED WHETHER SERIOUS COMORBIDITY PRESENT: ICD-10-CM

## 2025-03-26 DIAGNOSIS — D64.9 ANEMIA, UNSPECIFIED TYPE: ICD-10-CM

## 2025-03-26 DIAGNOSIS — Z13.220 LIPID SCREENING: ICD-10-CM

## 2025-03-26 LAB
25(OH)D3 SERPL-MCNC: 10.6 NG/ML (ref 30–100)
ALBUMIN SERPL-MCNC: 3.9 G/DL (ref 3.5–5.2)
ALBUMIN/GLOB SERPL: 1 {RATIO} (ref 1–2.5)
ALP SERPL-CCNC: 108 U/L (ref 35–104)
ALT SERPL-CCNC: 18 U/L (ref 10–35)
ANION GAP SERPL CALCULATED.3IONS-SCNC: 11 MMOL/L (ref 9–16)
AST SERPL-CCNC: 26 U/L (ref 10–35)
BASOPHILS # BLD: 0.04 K/UL (ref 0–0.2)
BASOPHILS NFR BLD: 1 % (ref 0–2)
BILIRUB SERPL-MCNC: 0.4 MG/DL (ref 0–1.2)
BUN SERPL-MCNC: 9 MG/DL (ref 6–20)
CALCIUM SERPL-MCNC: 9.6 MG/DL (ref 8.6–10.4)
CHLORIDE SERPL-SCNC: 106 MMOL/L (ref 98–107)
CHOLEST SERPL-MCNC: 174 MG/DL (ref 0–199)
CHOLESTEROL/HDL RATIO: 3.6
CO2 SERPL-SCNC: 23 MMOL/L (ref 20–31)
CREAT SERPL-MCNC: 0.8 MG/DL (ref 0.6–0.9)
EOSINOPHIL # BLD: 0.16 K/UL (ref 0–0.44)
EOSINOPHILS RELATIVE PERCENT: 2 % (ref 1–4)
ERYTHROCYTE [DISTWIDTH] IN BLOOD BY AUTOMATED COUNT: 15.5 % (ref 11.8–14.4)
GFR, ESTIMATED: >90 ML/MIN/1.73M2
GLUCOSE SERPL-MCNC: 61 MG/DL (ref 74–99)
HCT VFR BLD AUTO: 36.1 % (ref 36.3–47.1)
HDLC SERPL-MCNC: 49 MG/DL
HGB BLD-MCNC: 10.8 G/DL (ref 11.9–15.1)
IMM GRANULOCYTES # BLD AUTO: 0.03 K/UL (ref 0–0.3)
IMM GRANULOCYTES NFR BLD: 0 %
LDLC SERPL CALC-MCNC: 112 MG/DL (ref 0–100)
LYMPHOCYTES NFR BLD: 2.69 K/UL (ref 1.1–3.7)
LYMPHOCYTES RELATIVE PERCENT: 32 % (ref 25–45)
MCH RBC QN AUTO: 26.8 PG (ref 25.2–33.5)
MCHC RBC AUTO-ENTMCNC: 29.9 G/DL (ref 28.4–34.8)
MCV RBC AUTO: 89.6 FL (ref 82.6–102.9)
MONOCYTES NFR BLD: 0.61 K/UL (ref 0.1–1.4)
MONOCYTES NFR BLD: 7 % (ref 2–8)
NEUTROPHILS NFR BLD: 58 % (ref 34–64)
NEUTS SEG NFR BLD: 4.85 K/UL (ref 1.5–8.1)
NRBC BLD-RTO: 0 PER 100 WBC
PLATELET # BLD AUTO: 239 K/UL (ref 138–453)
PMV BLD AUTO: 12 FL (ref 8.1–13.5)
POTASSIUM SERPL-SCNC: 4.1 MMOL/L (ref 3.7–5.3)
PROT SERPL-MCNC: 7.9 G/DL (ref 6.6–8.7)
RBC # BLD AUTO: 4.03 M/UL (ref 3.95–5.11)
RBC # BLD: ABNORMAL 10*6/UL
SODIUM SERPL-SCNC: 140 MMOL/L (ref 136–145)
TRIGL SERPL-MCNC: 66 MG/DL
TSH SERPL DL<=0.05 MIU/L-ACNC: 1.42 UIU/ML (ref 0.27–4.2)
VLDLC SERPL CALC-MCNC: 13 MG/DL (ref 1–30)
WBC OTHER # BLD: 8.4 K/UL (ref 4.5–13.5)

## 2025-03-27 ENCOUNTER — RESULTS FOLLOW-UP (OUTPATIENT)
Dept: INTERNAL MEDICINE CLINIC | Age: 20
End: 2025-03-27

## 2025-03-27 DIAGNOSIS — D64.9 ANEMIA, UNSPECIFIED TYPE: ICD-10-CM

## 2025-03-27 DIAGNOSIS — E55.9 VITAMIN D DEFICIENCY: Primary | ICD-10-CM

## 2025-03-27 LAB
BACTERIA URNS QL MICRO: ABNORMAL
BILIRUB UR QL STRIP: NEGATIVE
CASTS #/AREA URNS LPF: ABNORMAL /LPF (ref 0–8)
CHLAMYDIA DNA UR QL NAA+PROBE: NEGATIVE
CLARITY UR: ABNORMAL
COLOR UR: YELLOW
EPI CELLS #/AREA URNS HPF: ABNORMAL /HPF (ref 0–5)
EST. AVERAGE GLUCOSE BLD GHB EST-MCNC: 103 MG/DL
GLUCOSE UR STRIP-MCNC: NEGATIVE MG/DL
HBA1C MFR BLD: 5.2 % (ref 4–6)
HGB UR QL STRIP.AUTO: ABNORMAL
KETONES UR STRIP-MCNC: NEGATIVE MG/DL
LEUKOCYTE ESTERASE UR QL STRIP: ABNORMAL
N GONORRHOEA DNA UR QL NAA+PROBE: NEGATIVE
NITRITE UR QL STRIP: NEGATIVE
PH UR STRIP: 6.5 [PH] (ref 5–8)
PROT UR STRIP-MCNC: NEGATIVE MG/DL
RBC #/AREA URNS HPF: ABNORMAL /HPF (ref 0–4)
SP GR UR STRIP: 1 (ref 1–1.03)
SPECIMEN DESCRIPTION: NORMAL
UROBILINOGEN UR STRIP-ACNC: NORMAL EU/DL (ref 0–1)
WBC #/AREA URNS HPF: ABNORMAL /HPF (ref 0–5)

## 2025-03-27 RX ORDER — ERGOCALCIFEROL 1.25 MG/1
50000 CAPSULE, LIQUID FILLED ORAL WEEKLY
Qty: 12 CAPSULE | Refills: 0 | Status: SHIPPED | OUTPATIENT
Start: 2025-03-27

## 2025-03-27 RX ORDER — FERROUS SULFATE 325(65) MG
325 TABLET ORAL EVERY OTHER DAY
Qty: 45 TABLET | Refills: 1 | Status: SHIPPED | OUTPATIENT
Start: 2025-03-27

## 2025-03-28 DIAGNOSIS — R31.9 URINARY TRACT INFECTION WITH HEMATURIA, SITE UNSPECIFIED: Primary | ICD-10-CM

## 2025-03-28 DIAGNOSIS — N39.0 URINARY TRACT INFECTION WITH HEMATURIA, SITE UNSPECIFIED: Primary | ICD-10-CM

## 2025-03-28 LAB
MICROORGANISM SPEC CULT: ABNORMAL
SPECIMEN DESCRIPTION: ABNORMAL

## 2025-03-28 RX ORDER — NITROFURANTOIN 25; 75 MG/1; MG/1
100 CAPSULE ORAL 2 TIMES DAILY
Qty: 10 CAPSULE | Refills: 0 | Status: SHIPPED | OUTPATIENT
Start: 2025-03-28 | End: 2025-04-02

## 2025-03-28 ASSESSMENT — ENCOUNTER SYMPTOMS
COLOR CHANGE: 0
SORE THROAT: 0
TROUBLE SWALLOWING: 0

## 2025-03-28 NOTE — PROGRESS NOTES
Antibiotic sent  Orders Placed This Encounter   Medications    nitrofurantoin, macrocrystal-monohydrate, (MACROBID) 100 MG capsule     Sig: Take 1 capsule by mouth 2 times daily for 5 days     Dispense:  10 capsule     Refill:  0

## 2025-03-28 NOTE — TELEPHONE ENCOUNTER
Spoke with patient, notified of instructions. Scheduled followup   States she does have UTI symptoms currently.  -would like a call back if abx is sent

## 2025-05-29 ENCOUNTER — HOSPITAL ENCOUNTER (EMERGENCY)
Age: 20
Discharge: HOME | End: 2025-05-29
Attending: EMERGENCY MEDICINE
Payer: COMMERCIAL

## 2025-05-29 VITALS
OXYGEN SATURATION: 99 % | SYSTOLIC BLOOD PRESSURE: 129 MMHG | DIASTOLIC BLOOD PRESSURE: 103 MMHG | TEMPERATURE: 98.8 F | HEART RATE: 100 BPM | RESPIRATION RATE: 16 BRPM

## 2025-05-29 DIAGNOSIS — R10.30 LOWER ABDOMINAL PAIN: Primary | ICD-10-CM

## 2025-05-29 LAB
ALBUMIN SERPL-MCNC: 3.8 G/DL (ref 3.5–5.2)
ALBUMIN/GLOB SERPL: 1 {RATIO} (ref 1–2.5)
ALP SERPL-CCNC: 108 U/L (ref 35–104)
ALT SERPL-CCNC: 17 U/L (ref 10–35)
ANION GAP SERPL CALCULATED.3IONS-SCNC: 10 MMOL/L (ref 9–16)
AST SERPL-CCNC: 26 U/L (ref 10–35)
BACTERIA URNS QL MICRO: NORMAL
BASOPHILS # BLD: 0.05 K/UL (ref 0–0.2)
BASOPHILS NFR BLD: 1 % (ref 0–2)
BILIRUB SERPL-MCNC: 0.2 MG/DL (ref 0–1.2)
BILIRUB UR QL STRIP: NEGATIVE
BUN SERPL-MCNC: 14 MG/DL (ref 6–20)
CALCIUM SERPL-MCNC: 9.4 MG/DL (ref 8.6–10.4)
CASTS #/AREA URNS LPF: NORMAL /LPF (ref 0–8)
CHLORIDE SERPL-SCNC: 105 MMOL/L (ref 98–107)
CLARITY UR: CLEAR
CO2 SERPL-SCNC: 22 MMOL/L (ref 20–31)
COLOR UR: YELLOW
CREAT SERPL-MCNC: 1 MG/DL (ref 0.6–0.9)
EOSINOPHIL # BLD: 0.14 K/UL (ref 0–0.44)
EOSINOPHILS RELATIVE PERCENT: 2 % (ref 1–4)
EPI CELLS #/AREA URNS HPF: NORMAL /HPF (ref 0–5)
ERYTHROCYTE [DISTWIDTH] IN BLOOD BY AUTOMATED COUNT: 14.8 % (ref 11.8–14.4)
GFR, ESTIMATED: 82 ML/MIN/1.73M2
GLUCOSE SERPL-MCNC: 103 MG/DL (ref 74–99)
GLUCOSE UR STRIP-MCNC: NEGATIVE MG/DL
HCG SERPL QL: NEGATIVE
HCT VFR BLD AUTO: 36 % (ref 36.3–47.1)
HGB BLD-MCNC: 11.3 G/DL (ref 11.9–15.1)
HGB UR QL STRIP.AUTO: NEGATIVE
IMM GRANULOCYTES # BLD AUTO: <0.03 K/UL (ref 0–0.3)
IMM GRANULOCYTES NFR BLD: 0 %
KETONES UR STRIP-MCNC: NEGATIVE MG/DL
LEUKOCYTE ESTERASE UR QL STRIP: ABNORMAL
LIPASE SERPL-CCNC: 32 U/L (ref 13–60)
LYMPHOCYTES NFR BLD: 2.83 K/UL (ref 1.1–3.7)
LYMPHOCYTES RELATIVE PERCENT: 39 % (ref 25–45)
MCH RBC QN AUTO: 28.4 PG (ref 25.2–33.5)
MCHC RBC AUTO-ENTMCNC: 31.4 G/DL (ref 28.4–34.8)
MCV RBC AUTO: 90.5 FL (ref 82.6–102.9)
MICROORGANISM SPEC CULT: ABNORMAL
MONOCYTES NFR BLD: 0.52 K/UL (ref 0.1–1.4)
MONOCYTES NFR BLD: 7 % (ref 2–8)
NEUTROPHILS NFR BLD: 51 % (ref 34–64)
NEUTS SEG NFR BLD: 3.78 K/UL (ref 1.5–8.1)
NITRITE UR QL STRIP: NEGATIVE
NRBC BLD-RTO: 0 PER 100 WBC
PH UR STRIP: 8 [PH] (ref 5–8)
PLATELET # BLD AUTO: 219 K/UL (ref 138–453)
PMV BLD AUTO: 11.1 FL (ref 8.1–13.5)
POTASSIUM SERPL-SCNC: 4.3 MMOL/L (ref 3.7–5.3)
PROT SERPL-MCNC: 7.7 G/DL (ref 6.6–8.7)
PROT UR STRIP-MCNC: NEGATIVE MG/DL
RBC # BLD AUTO: 3.98 M/UL (ref 3.95–5.11)
RBC # BLD: ABNORMAL 10*6/UL
RBC #/AREA URNS HPF: NORMAL /HPF (ref 0–4)
SODIUM SERPL-SCNC: 137 MMOL/L (ref 136–145)
SP GR UR STRIP: 1.02 (ref 1–1.03)
SPECIMEN DESCRIPTION: ABNORMAL
UROBILINOGEN UR STRIP-ACNC: NORMAL EU/DL (ref 0–1)
WBC #/AREA URNS HPF: NORMAL /HPF (ref 0–5)
WBC OTHER # BLD: 7.3 K/UL (ref 4.5–13.5)

## 2025-05-29 PROCEDURE — 86403 PARTICLE AGGLUT ANTBDY SCRN: CPT

## 2025-05-29 PROCEDURE — 99283 EMERGENCY DEPT VISIT LOW MDM: CPT

## 2025-05-29 PROCEDURE — 83690 ASSAY OF LIPASE: CPT

## 2025-05-29 PROCEDURE — 6370000000 HC RX 637 (ALT 250 FOR IP)

## 2025-05-29 PROCEDURE — 84703 CHORIONIC GONADOTROPIN ASSAY: CPT

## 2025-05-29 PROCEDURE — 80053 COMPREHEN METABOLIC PANEL: CPT

## 2025-05-29 PROCEDURE — 85025 COMPLETE CBC W/AUTO DIFF WBC: CPT

## 2025-05-29 PROCEDURE — 81001 URINALYSIS AUTO W/SCOPE: CPT

## 2025-05-29 PROCEDURE — 87086 URINE CULTURE/COLONY COUNT: CPT

## 2025-05-29 RX ORDER — CEPHALEXIN 500 MG/1
500 CAPSULE ORAL ONCE
Status: COMPLETED | OUTPATIENT
Start: 2025-05-29 | End: 2025-05-29

## 2025-05-29 RX ORDER — CEPHALEXIN 500 MG/1
500 CAPSULE ORAL 2 TIMES DAILY
Qty: 14 CAPSULE | Refills: 0 | Status: SHIPPED | OUTPATIENT
Start: 2025-05-29 | End: 2025-06-05

## 2025-05-29 RX ADMIN — CEPHALEXIN 500 MG: 500 CAPSULE ORAL at 02:12

## 2025-05-29 ASSESSMENT — PAIN SCALES - GENERAL: PAINLEVEL_OUTOF10: 8

## 2025-05-29 ASSESSMENT — PAIN DESCRIPTION - LOCATION: LOCATION: ABDOMEN

## 2025-05-29 ASSESSMENT — PAIN - FUNCTIONAL ASSESSMENT: PAIN_FUNCTIONAL_ASSESSMENT: 0-10

## 2025-05-29 NOTE — ED PROVIDER NOTES
[facility]     Emergency Department     Faculty Attestation    I performed a history and physical examination of the patient and discussed management with the resident. I have reviewed and agree with the resident’s findings including all diagnostic interpretations, and treatment plans as written. Any areas of disagreement are noted on the chart. I was personally present for the key portions of any procedures. I have documented in the chart those procedures where I was not present during the key portions. I have reviewed the emergency nurses triage note. I agree with the chief complaint, past medical history, past surgical history, allergies, medications, social and family history as documented unless otherwise noted below. Documentation of the HPI, Physical Exam and Medical Decision Making performed by luisana is based on my personal performance of the HPI, PE and MDM. For Physician Assistant/ Nurse Practitioner cases/documentation I have personally evaluated this patient and have completed at least one if not all key elements of the E/M (history, physical exam, and MDM). Additional findings are as noted.    Note Started: 1:13 AM EDT     18 yo F suprapubic pain, no fever, no vomit,   -refused pelvic  PE [place] RN present, vss gcs 15   Abdomen no right lower quadrant tenderness, mild suprapubic tenderness, no distention, no rigidity, no rebound,  ------- treating cystitis, pt left prior to completing result     EKG Interpretation    Interpreted by me      CRITICAL CARE: There was a high probability of clinically significant/life threatening deterioration in this patient's condition which required my urgent intervention.  Total critical care time was 0 minutes.  This excludes any time for separately reportable procedures.       Yaw Carrasquillo DO  05/29/25 0114       Yaw Abel DO  05/29/25 0227

## 2025-05-29 NOTE — ED NOTES
The following labs were labeled with appropriate pt sticker and tubed to lab:     [] Blue     [x] Lavender   [] on ice  [x] Green/yellow  [] Green/black [] on ice  [] Erwin  [] on ice  [x] Yellow  [] Red  [] Pink  [] Type/ Screen  [] ABG  [] VBG    [] COVID-19 swab    [] Rapid  [] PCR  [] Flu swab  [] Peds Viral Panel     [] Urine Sample  [] Fecal Sample  [] Pelvic Cultures  [] Blood Cultures  [] X 2  [] STREP Cultures  [] Wound Cultures

## 2025-05-29 NOTE — ED NOTES
Pt arriving to ed 18 via triage with co of lower abd pain with no other co at this time  Pt is tender to touch in lower abd  No other co at this time  Pt is resting on stretcher with call light within reach.  Breathing is non labored and no acute distress is noted.   Will continue to follow plan of care

## 2025-05-29 NOTE — ED PROVIDER NOTES
[facility] EMERGENCY DEPARTMENT  Emergency Department Encounter  Emergency Medicine Resident     Pt Name:Breanne Mckeon  MRN: [id number]  Birthdate 2005  Date of evaluation: 5/29/25  PCP:  Evelina Wheat APRN - CNP  Note Started: 12:57 AM EDT      CHIEF COMPLAINT       Chief Complaint   Patient presents with    Abdominal Pain       HISTORY OF PRESENT ILLNESS  (Location/Symptom, Timing/Onset, Context/Setting, Quality, Duration, Modifying Factors, Severity.)      Breanne Mckeon is a 19 y.o. female who presents with lower abdominal pain.  Patient complains of midline lower abdominal pain beginning a couple hours prior to arrival.  She denies vaginal pain, vaginal bleed, vaginal discharge, pain in her right lower quadrant, nausea, vomiting, difficulty urinating or defecating, dysuria or any other complaints.  States her last period was May 10    PAST MEDICAL / SURGICAL / SOCIAL / FAMILY HISTORY      has a past medical history of Anemia during pregnancy, Dysmenorrhea, History of chlamydia (need TOR), Hx Gonorrhea (need TOR), Hx Trich (BULL neg), Mirena IUD placed 9/29/2020, and Pyelonephritis.       has a past surgical history that includes intrauterine device insertion.    Social History     Socioeconomic History    Marital status: Single     Spouse name: Not on file    Number of children: Not on file    Years of education: Not on file    Highest education level: Not on file   Occupational History    Not on file   Tobacco Use    Smoking status: Never    Smokeless tobacco: Never   Vaping Use    Vaping status: Never Used   Substance and Sexual Activity    Alcohol use: Yes     Comment: social    Drug use: Never    Sexual activity: Not Currently   Other Topics Concern    Not on file   Social History Narrative    Not on file     Social Drivers of Health     Financial Resource Strain: Low Risk  (2/27/2023)    Overall Financial Resource Strain (CARDIA)     Difficulty of Paying Living Expenses: Not hard at all   Food

## 2025-05-29 NOTE — DISCHARGE INSTRUCTIONS
You were seen today in the emergency department.  We have evaluated you and determined that you likely have a UTI.  We now feel you are safe for discharge home.    Please return to the emergency department immediately if develop any new or worsening concerns including chest pain, shortness of breath, abdominal pain, nausea, vomiting, diarrhea, weakness, loss consciousness, fever, chills, or any other concerns.    Please call your PCP and schedule appointment within the next 24 to 48 hours for follow-up.

## 2025-05-30 NOTE — PROGRESS NOTES
Reviewed patient's urine culture - culture positive for Group B streptococcus.  Patient was discharged on cephalexin, and culture is sensitive to prescribed medication.  Antibiotic prescribed at discharge is appropriate - no changes made to antibiotic regimen.     Garett Bradshaw, PharmD 5/30/2025 1:32 PM

## 2025-06-11 ENCOUNTER — OFFICE VISIT (OUTPATIENT)
Dept: OBGYN CLINIC | Age: 20
End: 2025-06-11

## 2025-06-11 ENCOUNTER — HOSPITAL ENCOUNTER (OUTPATIENT)
Age: 20
Setting detail: SPECIMEN
Discharge: HOME | End: 2025-06-11

## 2025-06-11 VITALS
BODY MASS INDEX: 47.77 KG/M2 | SYSTOLIC BLOOD PRESSURE: 100 MMHG | WEIGHT: 253 LBS | DIASTOLIC BLOOD PRESSURE: 64 MMHG | HEIGHT: 61 IN

## 2025-06-11 DIAGNOSIS — Z30.432 ENCOUNTER FOR IUD REMOVAL: Primary | ICD-10-CM

## 2025-06-11 RX ORDER — ASCORBIC ACID, CHOLECALCIFEROL, .ALPHA.-TOCOPHEROL ACETATE, DL-, PYRIDOXINE, FOLIC ACID, CYANOCOBALAMIN, CALCIUM, FERROUS FUMARATE, MAGNESIUM, DOCONEXENT 85; 200; 10; 25; 1; 12; 140; 27; 45; 300 [IU]/1; [IU]/1; [IU]/1; [IU]/1; MG/1; UG/1; MG/1; MG/1; MG/1; MG/1
1 CAPSULE, GELATIN COATED ORAL DAILY
Qty: 30 CAPSULE | Refills: 11 | Status: SHIPPED | OUTPATIENT
Start: 2025-06-11 | End: 2026-06-11

## 2025-06-11 RX ORDER — NITROFURANTOIN 25; 75 MG/1; MG/1
CAPSULE ORAL
COMMUNITY
Start: 2025-06-10

## 2025-06-11 RX ORDER — CEPHALEXIN 500 MG/1
CAPSULE ORAL
COMMUNITY
Start: 2025-06-10

## 2025-06-11 NOTE — PROGRESS NOTES
mirena IUD placed PP      Late prenatal care 07/28/2020     Dating/viability at 31w5d  Also first prenatal visit.               PLAN:  Barrier Recommendations for a minimum of 3 cycles  Rx PNV  Annual health follow-up  6/2025  Notify office of positive home pregnancy test    Orders Placed This Encounter   Procedures    Specimen to Pathology Outpatient     Standing Status:   Future     Expected Date:   6/11/2025     Expiration Date:   6/11/2026     PREVIOUS BIOPSY:   No     PREOP DIAGNOSIS:   IUD-gross exam only     FROZEN SECTION - NO OR YES/SPECIMEN:   No

## 2025-06-13 LAB — SURGICAL PATHOLOGY REPORT: NORMAL

## 2025-06-20 ENCOUNTER — HOSPITAL ENCOUNTER (EMERGENCY)
Age: 20
Discharge: HOME | End: 2025-06-20
Attending: EMERGENCY MEDICINE
Payer: COMMERCIAL

## 2025-06-20 VITALS
DIASTOLIC BLOOD PRESSURE: 81 MMHG | OXYGEN SATURATION: 99 % | SYSTOLIC BLOOD PRESSURE: 114 MMHG | TEMPERATURE: 97.9 F | RESPIRATION RATE: 20 BRPM | HEART RATE: 90 BPM

## 2025-06-20 DIAGNOSIS — R04.0 EPISTAXIS: Primary | ICD-10-CM

## 2025-06-20 PROCEDURE — 99284 EMERGENCY DEPT VISIT MOD MDM: CPT | Performed by: EMERGENCY MEDICINE

## 2025-06-20 PROCEDURE — 2500000003 HC RX 250 WO HCPCS: Performed by: STUDENT IN AN ORGANIZED HEALTH CARE EDUCATION/TRAINING PROGRAM

## 2025-06-20 RX ADMIN — PHENYLEPHRINE HYDROCHLORIDE 1 SPRAY: 0.5 SPRAY NASAL at 14:25

## 2025-06-20 ASSESSMENT — ENCOUNTER SYMPTOMS
SHORTNESS OF BREATH: 0
COLOR CHANGE: 0
NAUSEA: 0
BACK PAIN: 0
ABDOMINAL PAIN: 0
WHEEZING: 0
COUGH: 0
VOMITING: 0

## 2025-06-20 ASSESSMENT — LIFESTYLE VARIABLES
HOW OFTEN DO YOU HAVE A DRINK CONTAINING ALCOHOL: NEVER
HOW MANY STANDARD DRINKS CONTAINING ALCOHOL DO YOU HAVE ON A TYPICAL DAY: PATIENT DOES NOT DRINK

## 2025-06-20 ASSESSMENT — PAIN - FUNCTIONAL ASSESSMENT: PAIN_FUNCTIONAL_ASSESSMENT: NONE - DENIES PAIN

## 2025-06-20 NOTE — ED PROVIDER NOTES
[facility]     Emergency Department     Faculty Attestation    I performed a history and physical examination of the patient and discussed management with the resident. I reviewed the resident's note and agree with the documented findings and plan of care. Any areas of disagreement are noted on the chart. I was personally present for the key portions of any procedures. I have documented in the chart those procedures where I was not present during the key portions. I have reviewed the emergency nurses triage note. I agree with the chief complaint, past medical history, past surgical history, allergies, medications, social and family history as documented unless otherwise noted below. For Physician Assistant/ Nurse Practitioner cases/documentation I have personally evaluated this patient and have completed at least one if not all key elements of the E/M (history, physical exam, and MDM). Additional findings are as noted.    No active nasal bleeding at this time, dried blood right naris, slight deviated septum.     Enrrique Lange MD  06/20/25 3012

## 2025-06-20 NOTE — ED PROVIDER NOTES
[facility] EMERGENCY DEPARTMENT  Emergency Department Encounter  Emergency Medicine Resident     Pt Name:Breanne Mckeon  MRN: [id number]  Birthdate 2005  Date of evaluation: 6/20/25  PCP:  Evelina Wheat APRN - CNP  Note Started: 2:17 PM EDT      CHIEF COMPLAINT       Chief Complaint   Patient presents with    Epistaxis     Bleeding from right nare x 6 days        HISTORY OF PRESENT ILLNESS  (Location/Symptom, Timing/Onset, Context/Setting, Quality, Duration, Modifying Factors, Severity.)      Breanne Mckeon is a 19 y.o. female with no significant medical history who presents emergency department with chief complaint of epistaxis which has been intermittent over the past 6 days.  Patient's not on anticoagulation and denies any trauma/assault.  Alert and orient x 3.  GCS 15.  No active nosebleed on arrival.  She does have dried blood in the left nare.  Denies headaches, dizziness, lightheadedness.  Appears very comfortable.  States that she is unsure why she has had a nosebleed.  Denies any family history of blood disorders.  Well-appearing on arrival with unremarkable vitals.    PAST MEDICAL / SURGICAL / SOCIAL / FAMILY HISTORY      has a past medical history of Anemia during pregnancy, Dysmenorrhea, History of chlamydia (need TOR), Hx Gonorrhea (need TOR), Hx Trich (BULL neg), Mirena IUD placed 9/29/2020, and Pyelonephritis.       has a past surgical history that includes intrauterine device insertion.      Social History     Socioeconomic History    Marital status: Single     Spouse name: Not on file    Number of children: Not on file    Years of education: Not on file    Highest education level: Not on file   Occupational History    Not on file   Tobacco Use    Smoking status: Never    Smokeless tobacco: Never   Vaping Use    Vaping status: Never Used   Substance and Sexual Activity    Alcohol use: Yes     Comment: social    Drug use: Never    Sexual activity: Yes     Partners: Male     Birth  APRN - NP   ferrous sulfate (IRON 325) 325 (65 Fe) MG tablet Take 1 tablet by mouth every other day 3/27/25   Evelina Wheat APRN - CNP   vitamin D (ERGOCALCIFEROL) 1.25 MG (89288 UT) CAPS capsule Take 1 capsule by mouth once a week 3/27/25   Evelina Wheat APRN - CNP       REVIEW OF SYSTEMS       Review of Systems   Constitutional:  Negative for fatigue and fever.   HENT:  Positive for nosebleeds. Negative for congestion and mouth sores.    Respiratory:  Negative for cough, shortness of breath and wheezing.    Cardiovascular:  Negative for chest pain.   Gastrointestinal:  Negative for abdominal pain, nausea and vomiting.   Musculoskeletal:  Negative for back pain, neck pain and neck stiffness.   Skin:  Negative for color change and wound.   Neurological:  Negative for dizziness, syncope, weakness, light-headedness, numbness and headaches.   Psychiatric/Behavioral:  Negative for agitation and confusion. The patient is not nervous/anxious.        PHYSICAL EXAM      INITIAL VITALS:   /81   Pulse 90   Temp 97.9 °F (36.6 °C)   Resp 20   LMP 06/10/2025 (Exact Date)   SpO2 99%     Physical Exam  Constitutional:       General: She is not in acute distress.     Appearance: Normal appearance. She is obese. She is not ill-appearing, toxic-appearing or diaphoretic.   HENT:      Head: Normocephalic and atraumatic.      Right Ear: Ear canal and external ear normal.      Left Ear: Ear canal and external ear normal.      Nose:      Comments: External nose normal without signs of trauma.  She does have dried blood in the left nare.  No nasal mucosal trauma.  No septal hematoma.  No active epistaxis.     Mouth/Throat:      Mouth: Mucous membranes are moist.      Pharynx: Oropharynx is clear. No posterior oropharyngeal erythema.   Eyes:      General:         Right eye: No discharge.         Left eye: No discharge.      Extraocular Movements: Extraocular movements intact.      Conjunctiva/sclera: Conjunctivae normal.

## 2025-06-25 ENCOUNTER — HOSPITAL ENCOUNTER (OUTPATIENT)
Age: 20
Setting detail: SPECIMEN
Discharge: HOME | End: 2025-06-25

## 2025-06-25 ENCOUNTER — OFFICE VISIT (OUTPATIENT)
Dept: OBGYN CLINIC | Age: 20
End: 2025-06-25
Payer: COMMERCIAL

## 2025-06-25 VITALS
WEIGHT: 255 LBS | SYSTOLIC BLOOD PRESSURE: 100 MMHG | HEIGHT: 61 IN | BODY MASS INDEX: 48.15 KG/M2 | DIASTOLIC BLOOD PRESSURE: 68 MMHG

## 2025-06-25 DIAGNOSIS — Z01.419 WELL WOMAN EXAM WITH ROUTINE GYNECOLOGICAL EXAM: Primary | ICD-10-CM

## 2025-06-25 PROCEDURE — 99395 PREV VISIT EST AGE 18-39: CPT | Performed by: NURSE PRACTITIONER

## 2025-06-25 NOTE — PROGRESS NOTES
as onset for bone density testing.  Birth control and barrier recommendations discussed.  STD counseling and prevention reviewed.  Gardisil counseling completed for all patients 9-44 yo.  Routine health maintenance per patients PCP.  Orders Placed This Encounter   Procedures    PAP SMEAR     Patient History:    Patient's last menstrual period was 06/10/2025 (exact date).  OBGYN Status: Having periods  Past Surgical History:  No date: INTRAUTERINE DEVICE INSERTION    Problem List         Diagnosed      Edg Problems Affecting Cytology    History of chlamydia     Overview Addendum 8/26/2020  7:42 PM by Flor Golden DO   7/28/20+  BULL negative 8/25/20       History of trichomoniasis     Overview Addendum 9/11/2020  8:37 AM by Luisito Heredia DO   7/28/20 positive. BULL neg 8/25/20.      Social History    Tobacco Use      Smoking status: Never      Smokeless tobacco: Never       Standing Status:   Future     Expected Date:   6/25/2025     Expiration Date:   6/25/2026     Collection Type:   Thin Prep     Prior Abnormal Pap Test:   No     Screening or Diagnostic:   Screening     HPV Requested?:   N/A     High Risk Patient:   N/A           The patient, Breanne Mckeon is a 19 y.o. female, was seen with a total time spent of 30 minutes for the visit on this date of service by the E/M provider. The time component had both face to face and non face to face time spent in determining the total time component.  Counseling and education regarding her diagnosis listed below and her options regarding those diagnoses were also included in determining her time component.      Diagnosis Orders   1. Well woman exam with routine gynecological exam  PAP SMEAR           The patient had her preventative health maintenance recommendations and follow-up reviewed with her at the completion of her visit.

## 2025-07-07 ENCOUNTER — RESULTS FOLLOW-UP (OUTPATIENT)
Dept: OBGYN CLINIC | Age: 20
End: 2025-07-07

## 2025-07-07 LAB — CYTOLOGY REPORT: NORMAL

## 2025-07-07 RX ORDER — METRONIDAZOLE 500 MG/1
500 TABLET ORAL 2 TIMES DAILY
Qty: 14 TABLET | Refills: 0 | Status: SHIPPED | OUTPATIENT
Start: 2025-07-07 | End: 2025-07-14

## 2025-07-07 NOTE — TELEPHONE ENCOUNTER
----- Message from LION Gramajo NP sent at 7/7/2025  3:50 PM EDT -----  + Trich  Flagyl 500mg PO BID x 7 days   BULL 2-3 weeks  Abstain  Partner needs treated   Pap smear LSIL  Repeat in 1 year

## 2025-07-07 NOTE — TELEPHONE ENCOUNTER
[facility] Result Note Patient Contact Communication   [address] [place] [address]      07/07/25   4:20 PM     Patients Name: Breanne Mckeon   Medical Record Number: [id number]   Contact Serial Number: [id number]  YOB: 2005       Patients Phone Number: [phone]     Description of Recommendations:  The patient was contacted and her identity was confirmed with two of the specific identifiers listed above.  The information regarding her recent testing results and provider recommendations were reviewed with her in detail and all questions were answered.   Recent labs/Cultures/ Imaging Studies/Pathology reports and Urinalysis results are included:      Recommendations given by provider:  ----- Message from LION Gramajo NP sent at 7/7/2025  3:50 PM EDT -----  + Trich  Flagyl 500mg PO BID x 7 days   BULL 2-3 weeks  Abstain  Partner needs treated   Pap smear LSIL  Repeat in 1 year        The patient verbalized understanding of the information above and the recommendation by the provider. She will contact her PCP if any other questions arise or contact our office for any other clarifications.        Electronically signed by Tata Severino on 7/7/2025 at 4:20 PM           EENMT

## 2025-07-21 ENCOUNTER — OFFICE VISIT (OUTPATIENT)
Dept: OBGYN CLINIC | Age: 20
End: 2025-07-21
Payer: COMMERCIAL

## 2025-07-21 VITALS
DIASTOLIC BLOOD PRESSURE: 70 MMHG | SYSTOLIC BLOOD PRESSURE: 116 MMHG | WEIGHT: 253 LBS | BODY MASS INDEX: 46.56 KG/M2 | HEIGHT: 62 IN

## 2025-07-21 DIAGNOSIS — Z20.2 TRICHOMONAS CONTACT, TREATED: Primary | ICD-10-CM

## 2025-07-21 PROCEDURE — 99213 OFFICE O/P EST LOW 20 MIN: CPT | Performed by: NURSE PRACTITIONER

## 2025-07-21 PROCEDURE — G8428 CUR MEDS NOT DOCUMENT: HCPCS | Performed by: NURSE PRACTITIONER

## 2025-07-21 PROCEDURE — G8417 CALC BMI ABV UP PARAM F/U: HCPCS | Performed by: NURSE PRACTITIONER

## 2025-07-21 PROCEDURE — 1036F TOBACCO NON-USER: CPT | Performed by: NURSE PRACTITIONER

## 2025-07-21 NOTE — PROGRESS NOTES
Tisha Mckeon  2025    YOB: 2005          The patient was seen today. She is here regarding BULL for + trich. States she took treatment. States she notified her partner- unsure if they were treated. Patient admits this is patient she is interested in having intercourse again with- reviewed him needing tx'ed prior to intercourse otherwise will get trich again. Reviewed barrier use . Her bowels are regular and she is voiding without difficulty.     HPI:  Tisha Mckeon is a 19 y.o. female  trich treated       OB History    Para Term  AB Living   1 1 1 0 0 1   SAB IAB Ectopic Molar Multiple Live Births   0 0 0 0 0 1      # Outcome Date GA Lbr Jaron/2nd Weight Sex Type Anes PTL Lv   1 Term 20 40w6d  3.445 kg (7 lb 9.5 oz) M Vag-Spont EPI N MARGARET      Name: JN,BABY BOY TISHA      Apgar1: 8  Apgar5: 8       Past Medical History:   Diagnosis Date    Anemia during pregnancy 2020    Venofer 300 mg weekly x 3 First infusion scheduled for 2020 at 12 noon, pt father notified.-SK-NO SHOW    Dysmenorrhea 2020    Desires mirena PP    History of chlamydia (need TOR) 2020+ BULL negative 20    Hx Gonorrhea (need TOR) 2020    Positive 20 BULL negative 20    Hx Trich (BULL neg) 2020 positive. BULL neg 20.    Mirena IUD placed 2020    Lot: [id number] Exp date: 2022 Remove 2025    Pyelonephritis        Past Surgical History:   Procedure Laterality Date    INTRAUTERINE DEVICE INSERTION         Family History   Problem Relation Age of Onset    No Known Problems Mother     Hypertension Father     Diabetes Father     Diabetes Cousin         4 maternal cousins       Social History     Socioeconomic History    Marital status: Single     Spouse name: Not on file    Number of children: Not on file    Years of education: Not on file    Highest education level: Not on file   Occupational History    Not on

## 2025-07-22 ENCOUNTER — RESULTS FOLLOW-UP (OUTPATIENT)
Dept: OBGYN CLINIC | Age: 20
End: 2025-07-22

## 2025-07-23 RX ORDER — METRONIDAZOLE 500 MG/1
500 TABLET ORAL 2 TIMES DAILY
Qty: 14 TABLET | Refills: 0 | Status: SHIPPED | OUTPATIENT
Start: 2025-07-23 | End: 2025-07-30

## 2025-07-23 NOTE — TELEPHONE ENCOUNTER
LM for pt to contact office with question/concerns regarding her results. Pt viewed results via [facility].  Flagyl 500mg sent to pt pharm.   ---- Message from LION De La Cruz - CNP sent at 7/22/2025  2:06 PM EDT -----  +BV-flagyl 500mg PO BID x 7 days

## 2025-07-23 NOTE — TELEPHONE ENCOUNTER
----- Message from LION De La Cruz - CNP sent at 7/22/2025  2:06 PM EDT -----  +BV-flagyl 500mg PO BID x 7 days